# Patient Record
Sex: MALE | Race: ASIAN | NOT HISPANIC OR LATINO
[De-identification: names, ages, dates, MRNs, and addresses within clinical notes are randomized per-mention and may not be internally consistent; named-entity substitution may affect disease eponyms.]

---

## 2024-02-27 ENCOUNTER — TRANSCRIPTION ENCOUNTER (OUTPATIENT)
Age: 54
End: 2024-02-27

## 2024-02-27 ENCOUNTER — APPOINTMENT (OUTPATIENT)
Dept: ORTHOPEDIC SURGERY | Facility: CLINIC | Age: 54
End: 2024-02-27
Payer: COMMERCIAL

## 2024-02-27 VITALS — WEIGHT: 154 LBS | HEIGHT: 68 IN | BODY MASS INDEX: 23.34 KG/M2

## 2024-02-27 DIAGNOSIS — E78.00 PURE HYPERCHOLESTEROLEMIA, UNSPECIFIED: ICD-10-CM

## 2024-02-27 DIAGNOSIS — J45.909 UNSPECIFIED ASTHMA, UNCOMPLICATED: ICD-10-CM

## 2024-02-27 PROBLEM — Z00.00 ENCOUNTER FOR PREVENTIVE HEALTH EXAMINATION: Status: ACTIVE | Noted: 2024-02-27

## 2024-02-27 PROCEDURE — 73110 X-RAY EXAM OF WRIST: CPT | Mod: RT

## 2024-02-27 PROCEDURE — 73130 X-RAY EXAM OF HAND: CPT | Mod: RT

## 2024-02-27 PROCEDURE — 99203 OFFICE O/P NEW LOW 30 MIN: CPT

## 2024-02-27 RX ORDER — MELOXICAM 15 MG/1
15 TABLET ORAL
Qty: 30 | Refills: 0 | Status: ACTIVE | COMMUNITY
Start: 2024-02-27 | End: 1900-01-01

## 2024-02-27 NOTE — HISTORY OF PRESENT ILLNESS
[de-identified] : 02/27/2024: 02/27/2024: Patient is a 54 yo RHD male presenting for evaluation of his right hand/wrist. Onset: 2 weeks ago. Pt denies any prior injury and states that he woke up with hand and wrist pain. He reports that the pain intensity has remained the same since it began. Pt only feels pain with certain hand movements and with activities like washing dishes, using his phone, and brushing his teeth. No radiating pain, he notes tingling in his wrist. He used Biofreeze and Tylenol for 2 days. No weakness, no swelling, non-smoker, no blood thinners, nondiabetic.   Works in IT.   [] : no [FreeTextEntry1] : right hand/ wrist  [de-identified] : certain movements

## 2024-02-27 NOTE — ASSESSMENT
[FreeTextEntry1] : 2 weeks of R wrist pain, exam consistent with dequervains. XR normal.  - Thumb spica brace recommended with lifting and at night - Mobic once daily for pain - Tylenol and ice as needed - Follow up in 3-4 weeks. If not improved consider CSI which was offered today and declined by patient

## 2024-02-27 NOTE — PHYSICAL EXAM
[de-identified] : Constitutional: Well developed, well nourished, able to communicate Neuro: Normal sensation, No focal deficits Skin: Intact CV: Peripheral vascular exam grossly normal Pulm: No signs of respiratory distress Psych: Oriented, normal mood and affect

## 2024-02-27 NOTE — IMAGING
[de-identified] :  L wrist & hand: - No obvious deformity or muscle wasting - No pain with palpation throughout distal radius/ulna, wrist, hand, or fingers - ROM intact throughout wrist flexion, extension, supination, pronation, radial/ulnar deviation - ROM intact throughout finger flexion/extension of MCP, PIP, and DIPs - 5/5 Strength throughout wrist and hand - Distally neurovascularly intact     R wrist & hand: - No obvious deformity or muscle wasting - pain over radial aspect of wrist and 1st dorsal compartment - No pain with palpation throughout remainder of distal radius/ulna, wrist, hand, or fingers - ROM intact throughout wrist flexion, extension, supination, pronation, radial/ulnar deviation - ROM intact throughout finger flexion/extension of MCP, PIP, and DIPs - 5/5 Strength throughout wrist and hand - No pain to snuffbox - Pain with Finkelstein maneuver  - Distally neurovascularly intact    [Right] : right wrist [There are no fractures, subluxations or dislocations. No significant abnormalities are seen] : There are no fractures, subluxations or dislocations. No significant abnormalities are seen

## 2024-03-26 ENCOUNTER — APPOINTMENT (OUTPATIENT)
Dept: ORTHOPEDIC SURGERY | Facility: CLINIC | Age: 54
End: 2024-03-26
Payer: COMMERCIAL

## 2024-03-26 DIAGNOSIS — M65.4 RADIAL STYLOID TENOSYNOVITIS [DE QUERVAIN]: ICD-10-CM

## 2024-03-26 DIAGNOSIS — M25.531 PAIN IN RIGHT WRIST: ICD-10-CM

## 2024-03-26 PROCEDURE — 99213 OFFICE O/P EST LOW 20 MIN: CPT

## 2024-03-26 NOTE — PHYSICAL EXAM
[de-identified] : Constitutional: Well developed, well nourished, able to communicate Neuro: Normal sensation, No focal deficits Skin: Intact CV: Peripheral vascular exam grossly normal Pulm: No signs of respiratory distress Psych: Oriented, normal mood and affect

## 2024-03-26 NOTE — IMAGING
[Right] : right wrist [There are no fractures, subluxations or dislocations. No significant abnormalities are seen] : There are no fractures, subluxations or dislocations. No significant abnormalities are seen [de-identified] :  L wrist & hand: - No obvious deformity or muscle wasting - No pain with palpation throughout distal radius/ulna, wrist, hand, or fingers - ROM intact throughout wrist flexion, extension, supination, pronation, radial/ulnar deviation - ROM intact throughout finger flexion/extension of MCP, PIP, and DIPs - 5/5 Strength throughout wrist and hand - Distally neurovascularly intact     R wrist & hand: - No obvious deformity or muscle wasting - pain over radial aspect of wrist and 1st dorsal compartment - No pain with palpation throughout remainder of distal radius/ulna, wrist, hand, or fingers - ROM intact throughout wrist flexion, extension, supination, pronation, radial/ulnar deviation - ROM intact throughout finger flexion/extension of MCP, PIP, and DIPs - 5/5 Strength throughout wrist and hand - No pain to snuffbox - Pain with Finkelstein maneuver  - Distally neurovascularly intact

## 2024-03-26 NOTE — HISTORY OF PRESENT ILLNESS
[6] : 6 [0] : 0 [Localized] : localized [Stabbing] : stabbing [Intermittent] : intermittent [Nothing helps with pain getting better] : Nothing helps with pain getting better [de-identified] : 02/27/2024: 02/27/2024: Patient is a 54 yo RHD male presenting for evaluation of his right hand/wrist. Onset: 2 weeks ago. Pt denies any prior injury and states that he woke up with hand and wrist pain. He reports that the pain intensity has remained the same since it began. Pt only feels pain with certain hand movements and with activities like washing dishes, using his phone, and brushing his teeth. No radiating pain, he notes tingling in his wrist. He used Biofreeze and Tylenol for 2 days. No weakness, no swelling, non-smoker, no blood thinners, nondiabetic.   Works in IT.    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 Patient reports 80% improvement since last visit. Patient notes he is still using his brace during night time only. Using Mobic daily.  [] : no [FreeTextEntry1] : right hand/ wrist  [de-identified] : certain movements

## 2024-04-22 ENCOUNTER — APPOINTMENT (OUTPATIENT)
Dept: ORTHOPEDIC SURGERY | Facility: CLINIC | Age: 54
End: 2024-04-22